# Patient Record
Sex: FEMALE | Race: WHITE
[De-identification: names, ages, dates, MRNs, and addresses within clinical notes are randomized per-mention and may not be internally consistent; named-entity substitution may affect disease eponyms.]

---

## 2019-10-31 ENCOUNTER — HOSPITAL ENCOUNTER (OUTPATIENT)
Dept: HOSPITAL 74 - JASU-SURG | Age: 54
Discharge: HOME | End: 2019-10-31
Attending: OBSTETRICS & GYNECOLOGY
Payer: COMMERCIAL

## 2019-10-31 VITALS — DIASTOLIC BLOOD PRESSURE: 80 MMHG | HEART RATE: 60 BPM | SYSTOLIC BLOOD PRESSURE: 150 MMHG

## 2019-10-31 VITALS — BODY MASS INDEX: 28.3 KG/M2

## 2019-10-31 VITALS — TEMPERATURE: 97.8 F

## 2019-10-31 DIAGNOSIS — K21.9: ICD-10-CM

## 2019-10-31 DIAGNOSIS — E06.3: ICD-10-CM

## 2019-10-31 DIAGNOSIS — I10: ICD-10-CM

## 2019-10-31 DIAGNOSIS — F41.8: ICD-10-CM

## 2019-10-31 DIAGNOSIS — N81.10: ICD-10-CM

## 2019-10-31 DIAGNOSIS — N39.3: Primary | ICD-10-CM

## 2019-10-31 PROCEDURE — 0TJB8ZZ INSPECTION OF BLADDER, VIA NATURAL OR ARTIFICIAL OPENING ENDOSCOPIC: ICD-10-PCS | Performed by: OBSTETRICS & GYNECOLOGY

## 2019-10-31 PROCEDURE — 0TSD0ZZ REPOSITION URETHRA, OPEN APPROACH: ICD-10-PCS | Performed by: OBSTETRICS & GYNECOLOGY

## 2019-10-31 PROCEDURE — 57288 REPAIR BLADDER DEFECT: CPT

## 2019-10-31 PROCEDURE — 57240 ANTERIOR COLPORRHAPHY: CPT

## 2019-10-31 PROCEDURE — 0JQC0ZZ REPAIR PELVIC REGION SUBCUTANEOUS TISSUE AND FASCIA, OPEN APPROACH: ICD-10-PCS | Performed by: OBSTETRICS & GYNECOLOGY

## 2019-10-31 NOTE — OP
Operative Note





- Note:


Operative Date: 10/30/19


Pre-Operative Diagnosis: prachi cystocele


Operation: sling , ant repair


Findings: 





none


Implants: sling


Post-Operative Diagnosis: Same as Pre-op


Surgeon: Lawrence Pool


Anesthesia: General


Specimens Removed: none

## 2019-10-31 NOTE — OP
DATE OF OPERATION:  

 

PREOPERATIVE DIAGNOSES:   Stress urinary incontinence, cystocele.  

 

POSTOPERATIVE DIAGNOSES:  Stress urinary incontinence, cystocele.  

 

PROCEDURE PERFORMED:  Suburethral sling, urethropexy, anterior colporrhaphy, and

cystoscopy.

 

PRIMARY SURGEON:  Lawrence Pool MD 

 

DESCRIPTION OF PROCEDURE:  After adequate anesthesia, patient was prepped and draped

in dorsal lithotomy position.  Evaluation under anesthesia revealed no pelvic mass

noted to be palpable.  Dilute solution of Pitressin injected in the anterior vaginal

wall and a vertical incision made over the anterior vaginal wall.  The perivesical

fascia was plicated away and plicated onto itself using 2-0 Vicryl suture in

interrupted fashion.  The mid urethral area was dissected off the space of Retzius. 

Transvaginal tape and sling were inserted in proper anatomical location.  Cystoscopy

was performed.  There were no foreign bodies in the bladder and urine emanating from

both ureteral orifices.  Minimal tension was applied on the sling arm.  Sling arm

plastic was removed.  Sling arm was cut off at the skin.  Skin closed using

Dermabond.  The anterior vaginal wall closed using 2-0 Vicryl suture in continuous

fashion.  

 

 

REJI [id number]

DD: 10/31/2019 10:24

DT: 10/31/2019 10:52

Job #:  [id number]

## 2022-06-13 ENCOUNTER — RX ONLY (RX ONLY)
Age: 57
End: 2022-06-13

## 2022-06-28 ENCOUNTER — OFFICE (OUTPATIENT)
Dept: URBAN - METROPOLITAN AREA CLINIC 29 | Facility: CLINIC | Age: 57
Setting detail: OPHTHALMOLOGY
End: 2022-06-28
Payer: COMMERCIAL

## 2022-06-28 DIAGNOSIS — H01.005: ICD-10-CM

## 2022-06-28 DIAGNOSIS — H01.002: ICD-10-CM

## 2022-06-28 DIAGNOSIS — H01.004: ICD-10-CM

## 2022-06-28 DIAGNOSIS — H16.223: ICD-10-CM

## 2022-06-28 DIAGNOSIS — H01.001: ICD-10-CM

## 2022-06-28 PROCEDURE — 92012 INTRM OPH EXAM EST PATIENT: CPT | Performed by: OPTOMETRIST

## 2022-06-28 ASSESSMENT — REFRACTION_MANIFEST
OS_AXIS: 085
OD_AXIS: 100
OS_VA1: 20/20
OD_ADD: +2.25
OD_CYLINDER: +1.25
OS_CYLINDER: +1.25
OD_VA1: 20/20-2
OS_ADD: +2.25
OD_SPHERE: -2.50
OS_SPHERE: -2.75

## 2022-06-28 ASSESSMENT — CONFRONTATIONAL VISUAL FIELD TEST (CVF)
OD_FINDINGS: FULL
OS_FINDINGS: FULL

## 2022-06-28 ASSESSMENT — SPHEQUIV_DERIVED
OD_SPHEQUIV: -1.875
OS_SPHEQUIV: -2.125

## 2022-06-28 ASSESSMENT — VISUAL ACUITY
OS_BCVA: 20/25-2
OD_BCVA: 20/20-2

## 2022-06-28 ASSESSMENT — TEAR BREAK UP TIME (TBUT)
OS_TBUT: 1+
OD_TBUT: 1+

## 2022-06-28 ASSESSMENT — REFRACTION_CURRENTRX
OD_CYLINDER: +1.25
OD_AXIS: 099
OS_SPHERE: -2.75
OS_AXIS: 086
OD_SPHERE: -2.50
OS_OVR_VA: 20/
OS_ADD: +2.25
OS_CYLINDER: +1.25
OD_ADD: +2.25
OD_OVR_VA: 20/

## 2022-06-28 ASSESSMENT — LID EXAM ASSESSMENTS
OS_BLEPHARITIS: LLL LUL T
OD_BLEPHARITIS: RLL RUL T

## 2022-06-28 ASSESSMENT — TONOMETRY
OS_IOP_MMHG: 14
OD_IOP_MMHG: 14

## 2023-07-11 ENCOUNTER — OFFICE (OUTPATIENT)
Dept: URBAN - METROPOLITAN AREA CLINIC 29 | Facility: CLINIC | Age: 58
Setting detail: OPHTHALMOLOGY
End: 2023-07-11
Payer: COMMERCIAL

## 2023-07-11 DIAGNOSIS — H01.004: ICD-10-CM

## 2023-07-11 DIAGNOSIS — H01.005: ICD-10-CM

## 2023-07-11 DIAGNOSIS — H16.223: ICD-10-CM

## 2023-07-11 DIAGNOSIS — H01.002: ICD-10-CM

## 2023-07-11 DIAGNOSIS — H01.001: ICD-10-CM

## 2023-07-11 PROCEDURE — 92014 COMPRE OPH EXAM EST PT 1/>: CPT | Performed by: OPTOMETRIST

## 2023-07-11 ASSESSMENT — REFRACTION_MANIFEST
OD_SPHERE: -2.50
OD_SPHERE: -2.75
OD_CYLINDER: +1.25
OS_VA1: 20/20
OD_VA1: 20/20-2
OS_ADD: +2.25
OS_SPHERE: -2.75
OS_AXIS: 085
OS_ADD: +2.50
OD_AXIS: 100
OS_VA1: 20/20
OD_CYLINDER: +1.25
OS_SPHERE: -2.75
OS_CYLINDER: +1.25
OD_AXIS: 100
OS_AXIS: 085
OS_CYLINDER: +1.25
OD_ADD: +2.50
OD_VA1: 20/25
OD_ADD: +2.25

## 2023-07-11 ASSESSMENT — VISUAL ACUITY
OS_BCVA: 20/30+1
OD_BCVA: 20/20-1

## 2023-07-11 ASSESSMENT — REFRACTION_CURRENTRX
OS_CYLINDER: +1.25
OD_AXIS: 100
OS_ADD: +2.25
OS_OVR_VA: 20/
OD_ADD: +2.50
OD_OVR_VA: 20/
OS_ADD: +2.50
OD_AXIS: 099
OS_SPHERE: -3.00
OD_CYLINDER: +1.25
OD_SPHERE: -2.75
OS_SPHERE: -2.75
OD_ADD: +2.25
OS_OVR_VA: 20/
OS_CYLINDER: +1.50
OS_AXIS: 90
OD_CYLINDER: +1.50
OD_SPHERE: -2.50
OD_OVR_VA: 20/
OS_AXIS: 086

## 2023-07-11 ASSESSMENT — TONOMETRY
OD_IOP_MMHG: 15
OS_IOP_MMHG: 15

## 2023-07-11 ASSESSMENT — REFRACTION_AUTOREFRACTION
OS_SPHERE: -3.00
OD_AXIS: 100
OD_CYLINDER: +1.25
OS_AXIS: 85
OS_CYLINDER: +1.25
OD_SPHERE: -2.75

## 2023-07-11 ASSESSMENT — SPHEQUIV_DERIVED
OS_SPHEQUIV: -2.125
OD_SPHEQUIV: -1.875
OD_SPHEQUIV: -2.125
OD_SPHEQUIV: -2.125
OS_SPHEQUIV: -2.375
OS_SPHEQUIV: -2.125

## 2023-07-11 ASSESSMENT — TEAR BREAK UP TIME (TBUT)
OD_TBUT: 1+
OS_TBUT: 1+

## 2023-07-11 ASSESSMENT — CONFRONTATIONAL VISUAL FIELD TEST (CVF)
OS_FINDINGS: FULL
OD_FINDINGS: FULL

## 2023-07-11 ASSESSMENT — LID EXAM ASSESSMENTS
OS_BLEPHARITIS: LLL LUL T
OD_BLEPHARITIS: RLL RUL T

## 2024-07-24 ENCOUNTER — OFFICE (OUTPATIENT)
Dept: URBAN - METROPOLITAN AREA CLINIC 29 | Facility: CLINIC | Age: 59
Setting detail: OPHTHALMOLOGY
End: 2024-07-24
Payer: COMMERCIAL

## 2024-07-24 DIAGNOSIS — H01.002: ICD-10-CM

## 2024-07-24 DIAGNOSIS — H01.005: ICD-10-CM

## 2024-07-24 DIAGNOSIS — H01.004: ICD-10-CM

## 2024-07-24 DIAGNOSIS — H01.001: ICD-10-CM

## 2024-07-24 DIAGNOSIS — H16.223: ICD-10-CM

## 2024-07-24 PROCEDURE — 92014 COMPRE OPH EXAM EST PT 1/>: CPT | Performed by: OPTOMETRIST

## 2024-07-24 ASSESSMENT — LID EXAM ASSESSMENTS
OD_BLEPHARITIS: RLL RUL T
OS_BLEPHARITIS: LLL LUL T

## 2024-07-24 ASSESSMENT — CONFRONTATIONAL VISUAL FIELD TEST (CVF)
OD_FINDINGS: FULL
OS_FINDINGS: FULL

## 2025-07-29 ENCOUNTER — OFFICE (OUTPATIENT)
Dept: URBAN - METROPOLITAN AREA CLINIC 29 | Facility: CLINIC | Age: 60
Setting detail: OPHTHALMOLOGY
End: 2025-07-29
Payer: COMMERCIAL

## 2025-07-29 DIAGNOSIS — H01.001: ICD-10-CM

## 2025-07-29 DIAGNOSIS — H01.004: ICD-10-CM

## 2025-07-29 DIAGNOSIS — H01.002: ICD-10-CM

## 2025-07-29 DIAGNOSIS — H01.005: ICD-10-CM

## 2025-07-29 DIAGNOSIS — H16.223: ICD-10-CM

## 2025-07-29 PROCEDURE — 92014 COMPRE OPH EXAM EST PT 1/>: CPT | Performed by: OPTOMETRIST

## 2025-07-29 ASSESSMENT — REFRACTION_CURRENTRX
OS_CYLINDER: +1.25
OD_CYLINDER: +1.25
OD_AXIS: 099
OD_ADD: +2.50
OD_OVR_VA: 20/
OS_ADD: +2.25
OS_ADD: +2.50
OD_CYLINDER: +0.75
OS_SPHERE: -3.25
OS_AXIS: 90
OS_SPHERE: -3.00
OD_AXIS: 099
OD_CYLINDER: +1.50
OS_OVR_VA: 20/
OD_VPRISM_DIRECTION: PROGS
OD_AXIS: 100
OD_OVR_VA: 20/
OS_VPRISM_DIRECTION: PROGS
OD_ADD: +2.25
OS_AXIS: 086
OS_CYLINDER: +1.75
OS_OVR_VA: 20/
OD_SPHERE: -2.75
OS_OVR_VA: 20/
OS_ADD: +2.50
OD_ADD: +2.75
OS_CYLINDER: +1.50
OD_OVR_VA: 20/
OS_SPHERE: -2.75
OD_SPHERE: -3.00
OD_SPHERE: -2.50
OS_AXIS: 087

## 2025-07-29 ASSESSMENT — LID EXAM ASSESSMENTS
OS_BLEPHARITIS: LLL LUL T
OD_BLEPHARITIS: RLL RUL T

## 2025-07-29 ASSESSMENT — REFRACTION_MANIFEST
OD_VA1: 20/20-2
OD_CYLINDER: +1.25
OS_CYLINDER: +1.25
OS_SPHERE: -2.75
OS_AXIS: 085
OS_SPHERE: -2.75
OD_CYLINDER: +1.25
OS_ADD: +2.50
OS_SPHERE: -2.75
OS_ADD: +2.25
OS_VA1: 20/20
OD_VA1: 20/25
OS_CYLINDER: +1.25
OD_CYLINDER: +1.25
OS_VA1: 20/20
OS_SPHERE: -2.75
OS_VA1: 20/20
OD_AXIS: 100
OD_SPHERE: -2.75
OD_VA1: 20/25+2
OS_CYLINDER: +1.25
OD_ADD: +2.50
OD_VA1: 20/25
OD_SPHERE: -2.75
OD_SPHERE: -2.75
OD_AXIS: 100
OS_CYLINDER: +1.25
OD_ADD: +2.50
OD_CYLINDER: +1.25
OD_AXIS: 100
OD_ADD: +2.50
OS_ADD: +2.50
OS_AXIS: 085
OD_SPHERE: -2.50
OS_VA1: 20/20
OD_ADD: +2.25
OD_AXIS: 100
OS_AXIS: 085
OS_ADD: +2.50
OS_AXIS: 085

## 2025-07-29 ASSESSMENT — CONFRONTATIONAL VISUAL FIELD TEST (CVF)
OD_FINDINGS: FULL
OS_FINDINGS: FULL

## 2025-07-29 ASSESSMENT — TEAR BREAK UP TIME (TBUT)
OD_TBUT: 1+
OS_TBUT: 1+

## 2025-07-29 ASSESSMENT — REFRACTION_AUTOREFRACTION
OS_SPHERE: -2.75
OD_CYLINDER: +1.00
OD_SPHERE: -2.50
OS_CYLINDER: +1.00
OS_AXIS: 085
OD_AXIS: 100

## 2025-07-29 ASSESSMENT — VISUAL ACUITY
OD_BCVA: 20/20
OS_BCVA: 20/30-2

## 2025-07-29 ASSESSMENT — TONOMETRY
OS_IOP_MMHG: 12
OD_IOP_MMHG: 12